# Patient Record
Sex: MALE | Employment: UNEMPLOYED | URBAN - METROPOLITAN AREA
[De-identification: names, ages, dates, MRNs, and addresses within clinical notes are randomized per-mention and may not be internally consistent; named-entity substitution may affect disease eponyms.]

---

## 2023-02-23 ENCOUNTER — HOSPITAL ENCOUNTER (EMERGENCY)
Age: 48
Discharge: LEFT BEFORE DISCHARGE | End: 2023-02-23
Attending: EMERGENCY MEDICINE

## 2023-02-23 ENCOUNTER — APPOINTMENT (OUTPATIENT)
Dept: CT IMAGING | Age: 48
End: 2023-02-23
Attending: EMERGENCY MEDICINE

## 2023-02-23 VITALS
HEART RATE: 115 BPM | OXYGEN SATURATION: 99 % | DIASTOLIC BLOOD PRESSURE: 100 MMHG | RESPIRATION RATE: 16 BRPM | TEMPERATURE: 97.2 F | SYSTOLIC BLOOD PRESSURE: 150 MMHG

## 2023-02-23 DIAGNOSIS — F31.9 BIPOLAR AFFECTIVE DISORDER, REMISSION STATUS UNSPECIFIED (HCC): Primary | ICD-10-CM

## 2023-02-23 DIAGNOSIS — R41.0 CONFUSION: ICD-10-CM

## 2023-02-23 PROCEDURE — 75810000275 HC EMERGENCY DEPT VISIT NO LEVEL OF CARE

## 2023-02-23 NOTE — ED PROVIDER NOTES
Shortly after receiving signout on this patient I was notified that he has had absconded from the emergency department, had been found by police outside of [facility], and was transported to [facility].  I did not have the opportunity to interview or examine him prior to these events.
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
## 2023-02-23 NOTE — ED NOTES
Pt talking about reptiles, refusing blood , pt stated he name is Andressa Dillard, when asked for an ID pt stated the horse wins, pt stated he lives on foot and doesn't have a house, pt with flight of ideas, pt refused to changed into [address]
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
## 2023-02-23 NOTE — ED PROVIDER NOTES
This is a 45-year-old male who was found wandering down the road in the initial call stipulated the patient had run out of gas. He will not answer any questions specifically other than name and date. He says he was just walking down the road. He clearly has either a metabolic encephalopathy or a psychotic episode. He has a known bipolar patient. It is unclear whether he is taking any medications at this time. No family is available and were not even completely sure of the patient's name. Any information we have came from EMS. We are trying to evaluate with labs and CT. Patient is unwilling to cooperate for either. I have asked the psychiatric counselor to see with the question of either TDO or E CO for further evaluation. [facility] states that they were called by bystanders who found [place]. It was reported that his car had run out of gas. The patient is not able to answer any question other than saying I was just walking down the street. He does say that he is not hurting anywhere and not short of breath. He will not answer any other specific questions. He does not appear to be focally neurologically compromised. We do not have any old hospital records on this patient. Not clear if he is taking any medications at this time. No past medical history on file. No past surgical history on file. No family history on file.     Social History     Socioeconomic History    Marital status: Not on file     Spouse name: Not on file    Number of children: Not on file    Years of education: Not on file    Highest education level: Not on file   Occupational History    Not on file   Tobacco Use    Smoking status: Not on file    Smokeless tobacco: Not on file   Substance and Sexual Activity    Alcohol use: Not on file    Drug use: Not on file    Sexual activity: Not on file   Other Topics Concern    Not on file   Social History Narrative    Not on file     Social Determinants of Health     Financial Resource Strain: Not on file   Food Insecurity: Not on file   Transportation Needs: Not on file   Physical Activity: Not on file   Stress: Not on file   Social Connections: Not on file   Intimate Partner Violence: Not on file   Housing Stability: Not on file         ALLERGIES: Patient has no allergy information on record. Review of Systems   Reason unable to perform ROS: Patient is unable to provide. Vitals:    02/23/23 1303   BP: (!) 150/100   Pulse: (!) 115   Resp: 16   Temp: 97.2 °F (36.2 °C)   SpO2: 99%            Physical Exam  Vitals and nursing note reviewed. Constitutional:       General: He is not in acute distress. Appearance: He is well-developed. He is not ill-appearing. Comments: This is a 52-year-old male who is in no acute distress apparently. Vital signs are as noted. He is however unable to answer any specific questions other than his name, date and year. Vital signs are as noted   HENT:      Head: Normocephalic and atraumatic. Nose: Nose normal.      Mouth/Throat:      Mouth: Mucous membranes are moist.   Eyes:      General: No scleral icterus. Conjunctiva/sclera: Conjunctivae normal.      Pupils: Pupils are equal, round, and reactive to light. Neck:      Thyroid: No thyromegaly. Vascular: No JVD. Trachea: No tracheal deviation. Comments: No carotid bruits noted. Cardiovascular:      Rate and Rhythm: Normal rate and regular rhythm. Heart sounds: Normal heart sounds. No murmur heard. No friction rub. No gallop. Pulmonary:      Effort: Pulmonary effort is normal. No respiratory distress. Breath sounds: Normal breath sounds. No wheezing or rales. Chest:      Chest wall: No tenderness. Abdominal:      General: Bowel sounds are normal. There is no distension. Palpations: Abdomen is soft. There is no mass. Tenderness: There is no abdominal tenderness. There is no guarding or rebound. Musculoskeletal:         General: No tenderness. Normal range of motion. Cervical back: Normal range of motion and neck supple. Lymphadenopathy:      Cervical: No cervical adenopathy. Skin:     General: Skin is warm and dry. Capillary Refill: Capillary refill takes 2 to 3 seconds. Findings: No erythema or rash. Neurological:      Mental Status: He is alert and oriented to person, place, and time. Cranial Nerves: No cranial nerve deficit. Coordination: Coordination normal.      Deep Tendon Reflexes: Reflexes are normal and symmetric. Comments: Patient is alert but confused   Psychiatric:         Thought Content: Thought content normal.         Judgment: Judgment normal.      Comments: Patient only answers questions by saying I was walking down the street and he keeps repeating that phrase. There is no focal limitation in his peripheral neurological status. There are no focal findings. Medical Decision Making  Amount and/or Complexity of Data Reviewed  Labs: ordered. Radiology: ordered. Procedures    The patient is not allowing any lab work to be drawn. He does not want any films. He is otherwise cooperative. The place after surgery has been with him. We are waiting BSMART to see him. He may likely require TDO for further evaluation. The bipolar dx with psychosis came from the sister of the patient per EMS.

## 2023-02-23 NOTE — BSMART NOTE
Whitney from [facility] confirmed that face sheet and assessment had been received. Evaluation will be done telehealth.      Miguelina Morris, Resident in Counseling

## 2023-02-23 NOTE — BSMART NOTE
BSMART assessment completed, and suicide risk level noted to be no. Charge Nurse Landon Card and Physician Roberta Mclaughlin notified. Concerns not observed. Security/Off- has not been notified.      Suad Ann, Resident in Counseling

## 2023-02-23 NOTE — ED NOTES
Pt went to CT , got up off the stretcher parked in the hallway and started running in the hallway then came back and got onto stretcher, Lyric Lees called, pt then went into CT room, un strapped himself and jumped off the stretcher running aggressively toward to door, not following directions, multiple security personnel with pt, pt walked out toward triage, through triage and out onto the street, continues to not follow directions, after a long walk [employer] arrived , pt went running across the street and taken down by [employer]

## 2023-02-23 NOTE — BSMART NOTE
Clinician received a call from Kenneth with [facility] inquiring about patient's whereabouts. It was reported that she spoke with a nurse who noted that patient was taken to [facility]".  Clinician noted that she was not aware that patient had eloped from the ER and taken to [facility].     It was later reported by Charge, at this time, Jessica Winkler that patent was placed under a paperless ECO and taken to Cristina Tenorio, Resident in Counseling

## 2023-02-23 NOTE — ED NOTES
Called [employer] pt was taken to [facility] by police